# Patient Record
Sex: MALE | Race: WHITE
[De-identification: names, ages, dates, MRNs, and addresses within clinical notes are randomized per-mention and may not be internally consistent; named-entity substitution may affect disease eponyms.]

---

## 2019-11-17 ENCOUNTER — HOSPITAL ENCOUNTER (EMERGENCY)
Dept: HOSPITAL 75 - ER FS | Age: 69
Discharge: HOME | End: 2019-11-17
Payer: MEDICARE

## 2019-11-17 VITALS — WEIGHT: 279.99 LBS | HEIGHT: 67.8 IN | BODY MASS INDEX: 42.93 KG/M2

## 2019-11-17 VITALS — DIASTOLIC BLOOD PRESSURE: 59 MMHG | SYSTOLIC BLOOD PRESSURE: 112 MMHG

## 2019-11-17 DIAGNOSIS — T85.631A: Primary | ICD-10-CM

## 2019-11-17 DIAGNOSIS — Z13.89: ICD-10-CM

## 2019-11-17 NOTE — ED INTEGUMENTARY GENERAL
General


Chief Complaint:  Skin/Wound Problems


Stated Complaint:  LEAK FROM PARACENTESIS





History of Present Illness


Date Seen by Provider:  Nov 17, 2019


Time Seen by Provider:  22:30


Initial Comments


The patient is a 69-year-old male with a history of nonalcoholic fatty liver 

disease with cirrhosis and ascites who has scheduled therapeutic paracenteses 

every week. He presents with concern for a small, indolent but persistent leak 

from his most recent paracentesis site. This is been leaking over the last 1-2 

days. He denies any other complaints or concerns at this time and states he 

otherwise is at his baseline. NO abdominal pain. Wife has been applying pressure

dressings without stopping the leak.





Allergies and Home Medications


Patient Home Medication List


Home Medication List Reviewed:  Yes





Review of Systems


Review of Systems


Constitutional:  see HPI





All Other Systems Reviewed


Negative Unless Noted:  Yes (Negative excepted noted.)





Past Medical-Social-Family Hx


Past Med/Social Hx:  Reviewed Nursing Past Med/Soc Hx


Patient Social History


Recent Foreign Travel:  No


Contact w/Someone Who Travel:  No





Family Medical History


Reviewed Nursing Family Hx





Physical Exam


Vital Signs


Capillary Refill :


General Appearance:  no apparent distress


Comments


This is an older  male who appearing nontoxic and in no acute distress.

Head is normocephalic and atraumatic. Neck is supple and nontender. Oropharynx 

is moist. Lungs are clear to auscultation in all stations. There is normal S1 

and S2 without rubs or gallops and capillary refill is appropriate, less than 2 

seconds globally. Abdomen is soft and protuberant and mildly distended with a 

positive fluid wave which patient states is normal for him. There is a 

paracentesis site in the right midabdomen which is minimally leaking clear 

ascitic fluid at a very low rate. No surrounding erythema, warmth, swelling or 

tenderness. Skin is warm and dry without cyanosis,, clubbing or edema. 

Psychiatrically, the patient demonstrates appropriate mood and affect and is 

alert.





Progress/Results/Core Measures


Progress


Progress Note :  


   Time:  22:58


Progress Note


Dermabond in multiple layers utilized to stop paracentesis site leak with good 

results. Pressure dressing replaced and patient is not having any further 

problems. He feels ready to go home. We'll proceed with discharge home at this 

time. Patient is counseled to follow up very closely with his GI physician in 

the next 1-2 days understands that if he feels worse instead of better or 

develops other new symptoms of concern that he should return right away for 

reevaluation. All questions are answered.





Departure


Impression





   Primary Impression:  


   Status post abdominal paracentesis


   Additional Impression:  


   Encounter for screening for other disorder


Disposition:  01 HOME, SELF-CARE


Condition:  Improved





Departure-Patient Inst.


Referrals:  


JOSÉ MIGUEL BRISENO MD (PCP/Family)


Primary Care Physician





Add. Discharge Instructions:  


Follow-up with your GI doctor in the next 1-2 days. Return to the emergency room

right away with worsen symptoms or other new concerns.











LASHAWN CORNELL MD              Nov 17, 2019 23:01


POS

## 2021-02-09 ENCOUNTER — HOSPITAL ENCOUNTER (EMERGENCY)
Dept: HOSPITAL 75 - ER FS | Age: 71
Discharge: ANOTHER HOSPITAL | End: 2021-02-09
Payer: MEDICARE

## 2021-02-09 VITALS — SYSTOLIC BLOOD PRESSURE: 119 MMHG | DIASTOLIC BLOOD PRESSURE: 49 MMHG

## 2021-02-09 VITALS — WEIGHT: 264.55 LBS | HEIGHT: 69.69 IN | BODY MASS INDEX: 38.3 KG/M2

## 2021-02-09 DIAGNOSIS — Z87.891: ICD-10-CM

## 2021-02-09 DIAGNOSIS — E66.9: ICD-10-CM

## 2021-02-09 DIAGNOSIS — N28.9: ICD-10-CM

## 2021-02-09 DIAGNOSIS — K72.90: Primary | ICD-10-CM

## 2021-02-09 DIAGNOSIS — K74.60: ICD-10-CM

## 2021-02-09 LAB
ALBUMIN SERPL-MCNC: 2.3 GM/DL (ref 3.2–4.5)
ALP SERPL-CCNC: 135 U/L (ref 40–136)
ALT SERPL-CCNC: 24 U/L (ref 0–55)
AMMONIA PLAS-SCNC: 118 UMOL/L (ref 11–32)
APTT PPP: YELLOW S
ARTERIAL PATENCY WRIST A: NEGATIVE
BACTERIA #/AREA URNS HPF: NEGATIVE /HPF
BASE EXCESS STD BLDA CALC-SCNC: 4.3 MMOL/L (ref -2.5–2.5)
BASOPHILS # BLD AUTO: 0 10^3/UL (ref 0–0.1)
BASOPHILS NFR BLD AUTO: 1 % (ref 0–10)
BDY SITE: (no result)
BILIRUB SERPL-MCNC: 3.4 MG/DL (ref 0.1–1)
BILIRUB UR QL STRIP: NEGATIVE
BODY TEMPERATURE: 35.4
BUN/CREAT SERPL: 22
CALCIUM SERPL-MCNC: 8.5 MG/DL (ref 8.5–10.1)
CHLORIDE SERPL-SCNC: 97 MMOL/L (ref 98–107)
CO2 BLDA CALC-SCNC: 27.7 MMOL/L (ref 21–31)
CO2 SERPL-SCNC: 25 MMOL/L (ref 21–32)
CREAT SERPL-MCNC: 1.83 MG/DL (ref 0.6–1.3)
EOSINOPHIL # BLD AUTO: 0.3 10^3/UL (ref 0–0.3)
EOSINOPHIL NFR BLD AUTO: 4 % (ref 0–10)
FIBRINOGEN PPP-MCNC: CLEAR MG/DL
GFR SERPLBLD BASED ON 1.73 SQ M-ARVRAT: 37 ML/MIN
GLUCOSE SERPL-MCNC: 174 MG/DL (ref 70–105)
GLUCOSE UR STRIP-MCNC: NEGATIVE MG/DL
HCT VFR BLD CALC: 33 % (ref 40–54)
HGB BLD-MCNC: 11.6 G/DL (ref 13.3–17.7)
HYALINE CASTS #/AREA URNS LPF: (no result) /LPF
INHALED O2 FLOW RATE: (no result) L/MIN
INR PPP: 1.5 (ref 0.8–1.4)
KETONES UR QL STRIP: NEGATIVE
LEUKOCYTE ESTERASE UR QL STRIP: NEGATIVE
LYMPHOCYTES # BLD AUTO: 0.8 X 10^3 (ref 1–4)
LYMPHOCYTES NFR BLD AUTO: 13 % (ref 12–44)
MAGNESIUM SERPL-MCNC: 2.2 MG/DL (ref 1.6–2.4)
MANUAL DIFFERENTIAL PERFORMED BLD QL: NO
MCH RBC QN AUTO: 38 PG (ref 25–34)
MCHC RBC AUTO-ENTMCNC: 35 G/DL (ref 32–36)
MCV RBC AUTO: 107 FL (ref 80–99)
MONOCYTES # BLD AUTO: 0.8 X 10^3 (ref 0–1)
MONOCYTES NFR BLD AUTO: 13 % (ref 0–12)
NEUTROPHILS # BLD AUTO: 4.3 X 10^3 (ref 1.8–7.8)
NEUTROPHILS NFR BLD AUTO: 69 % (ref 42–75)
NITRITE UR QL STRIP: NEGATIVE
PCO2 BLDA: 32 MMHG (ref 35–45)
PH BLDA: 7.53 [PH] (ref 7.37–7.43)
PH UR STRIP: 6.5 [PH] (ref 5–9)
PLATELET # BLD: 110 10^3/UL (ref 130–400)
PMV BLD AUTO: 9.7 FL (ref 7.4–10.4)
PO2 BLDA: 70 MMHG (ref 79–93)
POTASSIUM SERPL-SCNC: 4.6 MMOL/L (ref 3.6–5)
PROT SERPL-MCNC: 4.9 GM/DL (ref 6.4–8.2)
PROT UR QL STRIP: NEGATIVE
PROTHROMBIN TIME: 18.8 SEC (ref 12.2–14.7)
RBC #/AREA URNS HPF: (no result) /HPF
SAO2 % BLDA FROM PO2: 96 % (ref 94–100)
SODIUM SERPL-SCNC: 130 MMOL/L (ref 135–145)
SP GR UR STRIP: 1.01 (ref 1.02–1.02)
SQUAMOUS #/AREA URNS HPF: (no result) /HPF
TSH SERPL DL<=0.05 MIU/L-ACNC: 1.43 UIU/ML (ref 0.35–4.94)
VENTILATION MODE VENT: NO
WBC # BLD AUTO: 6.2 10^3/UL (ref 4.3–11)
WBC #/AREA URNS HPF: (no result) /HPF

## 2021-02-09 PROCEDURE — 82805 BLOOD GASES W/O2 SATURATION: CPT

## 2021-02-09 PROCEDURE — 87040 BLOOD CULTURE FOR BACTERIA: CPT

## 2021-02-09 PROCEDURE — 85610 PROTHROMBIN TIME: CPT

## 2021-02-09 PROCEDURE — 72125 CT NECK SPINE W/O DYE: CPT

## 2021-02-09 PROCEDURE — 70450 CT HEAD/BRAIN W/O DYE: CPT

## 2021-02-09 PROCEDURE — 85025 COMPLETE CBC W/AUTO DIFF WBC: CPT

## 2021-02-09 PROCEDURE — 51702 INSERT TEMP BLADDER CATH: CPT

## 2021-02-09 PROCEDURE — 83735 ASSAY OF MAGNESIUM: CPT

## 2021-02-09 PROCEDURE — 36415 COLL VENOUS BLD VENIPUNCTURE: CPT

## 2021-02-09 PROCEDURE — 81000 URINALYSIS NONAUTO W/SCOPE: CPT

## 2021-02-09 PROCEDURE — 83605 ASSAY OF LACTIC ACID: CPT

## 2021-02-09 PROCEDURE — 71045 X-RAY EXAM CHEST 1 VIEW: CPT

## 2021-02-09 PROCEDURE — 82140 ASSAY OF AMMONIA: CPT

## 2021-02-09 PROCEDURE — 84443 ASSAY THYROID STIM HORMONE: CPT

## 2021-02-09 PROCEDURE — 80053 COMPREHEN METABOLIC PANEL: CPT

## 2021-02-09 NOTE — DIAGNOSTIC IMAGING REPORT
INDICATION: Sepsis



Frontal chest obtained at 149 p.m. 



There is  no prior study for comparison.



Heart is normal in size. There is central vascular congestion.

There is some mild right basilar infiltrate versus atelectasis.

There is no pneumothorax.



IMPRESSION:



Poor inspiration. There is central vascular congestion with some

right basilar atelectatic change versus infiltrate.



Dictated by: 



  Dictated on workstation # [id number]

## 2021-02-09 NOTE — ED GENERAL
General


Chief Complaint:  Altered Mental Status


Stated Complaint:  AMS | HX LIVER FAILURE


Nursing Triage Note:  


PT HAS BEEN MORE ALTERED THAN USUAL.  HE HAS LIVER FAILURE AND HAS ELAVATED 


AMMONIA LEVELS AT TIMES.


Nursing Sepsis Screen:  No Definite Risk


Source of Information:  EMS, Family, Old Records


Exam Limitations:  No Limitations





History of Present Illness


Date Seen by Provider:  2021


Time Seen by Provider:  12:00


Initial Comments


This 70-year-old gentleman is brought to the emergency room via EMS from home 

due to altered mental status.  He is accompanied by his wife.  She reports he 

started exhibiting symptoms of confusion on .  On the evening of 

uary 6 or 7 he had a fall and bumped into the door frame.  He has some skin 

tears on his forearms as a result.  He has had decreased oral intake of food and

fluid.  He has also had decreased bowel movements which is unusual because he 

takes lactulose.  He has history of cirrhosis and saw his gastroenterologist in 

[place] last Wednesday.  He had a good report with her and diuretics were 

decreased at that time.  This morning he has been minimally responsive and 

incontinent of urine.  Blood pressure is lower than usual.  He has not had any 

of his morning medications due to decreased alertness.





Allergies and Home Medications


Patient Home Medication List


Home Medication List Reviewed:  Yes





Review of Systems


Review of Systems


Constitutional:  see HPI; No fever


EENTM:  no symptoms reported


Respiratory:  no symptoms reported


Cardiovascular:  see HPI


Gastrointestinal:  see HPI


Genitourinary:  see HPI


Musculoskeletal:  no symptoms reported


Skin:  see HPI, change in color (Jaundice)


Psychiatric/Neurological:  See HPI


Hematologic/Lymphatic:  No Symptoms Reported


Immunological/Allergic:  no symptoms reported





Past Medical-Social-Family Hx


Past Med/Social Hx:  Reviewed Nursing Past Med/Soc Hx


Patient Social History


Alcohol Use:  Denies Use


Smoking Status:  Former Smoker


2nd Hand Smoke Exposure:  No


Recent Infectious Disease Expo:  No


Recent Hopitalizations:  No





Seasonal Allergies


Seasonal Allergies:  No





Past Medical History


Surgeries:  Yes


Abdominal (TIPS)


Respiratory:  No


Cardiac:  Yes


Hypertension


Neurological:  No


Genitourinary:  No


Gastrointestinal:  Yes


Liver Disease/Jaundice


Musculoskeletal:  No


Endocrine:  No


HEENT:  No


Cancer:  No


Psychosocial:  No


Integumentary:  No


Blood Disorders:  No





Physical Exam


Vital Signs





Vital Signs - First Documented








 21





 12:00


 


Temp 35.4


 


Pulse 62


 


Resp 15


 


B/P (MAP) 100/82 (88)


 


Pulse Ox 99


 


O2 Delivery Room Air





Capillary Refill : Less Than 3 Seconds


Height, Weight, BMI


Height: '"


Weight: lbs. oz. kg; 38.00 BMI


Method:


General Appearance:  No Apparent Distress, WD/WN, Obese, Other (Minimally res

ponsive)


HEENT:  Normal ENT Inspection, Other (No obvious trauma)


Neck:  Normal Inspection


Respiratory:  Lungs Clear, No Accessory Muscle Use, No Respiratory Distress, 

Decreased Breath Sounds


Cardiovascular:  Regular Rate, Rhythm, No Edema, No Murmur, Normal Peripheral 

Pulses


Extremity:  No Pedal Edema, Other (Multiple skin tears and bruises on the upper 

extremities)


Neurologic/Psychiatric:  Other (Responsive to loud voice and painful stimulus.  

Moans and groans with stimulation.  Does not open eyes and does not have any 

comprehensible speech.)


Skin:  Warm/Dry, Jaundice





Focused Exam


Lactate Level


21 14:00: Lactic Acid Level 2.56*H


21 16:03: Lactic Acid Level 1.71





Lactic Acid Level








Progress/Results/Core Measures


Suspected Sepsis


Recent Fever Within 48 Hours:  Yes


Infection Criteria Present:  None


New/Unexplained  Altered Menta:  Yes


Sepsis Screen:  No Definite Risk


SIRS


Temperature: 


Pulse: 62 


Respiratory Rate: 15


 


Laboratory Tests


21 12:11: White Blood Count 6.2


Blood Pressure 100 /82 


Mean: 88


 


21 14:00: Lactic Acid Level 2.56*H


21 16:03: Lactic Acid Level 1.71


Laboratory Tests


21 12:11: Platelet Count 110L


21 12:12: 


Creatinine 1.83H, INR Comment 1.5H, Total Bilirubin 3.4H








Results/Orders


Lab Results





Laboratory Tests








Test


 21


12:11 21


12:12 21


12:34 21


14:00 Range/Units


 


 


White Blood Count


 6.2 


 


 


 


 4.3-11.0


10^3/uL


 


Red Blood Count


 3.08 L


 


 


 


 4.35-5.85


10^6/uL


 


Hemoglobin 11.6 L    13.3-17.7  G/DL


 


Hematocrit 33 L    40-54  %


 


Mean Corpuscular Volume 107 H    80-99  FL


 


Mean Corpuscular Hemoglobin 38 H    25-34  PG


 


Mean Corpuscular Hemoglobin


Concent 35 


 


 


 


 32-36  G/DL





 


Red Cell Distribution Width 14.7 H    10.0-14.5  %


 


Platelet Count


 110 L


 


 


 


 130-400


10^3/uL


 


Mean Platelet Volume 9.7     7.4-10.4  FL


 


Immature Granulocyte % (Auto) 1      %


 


Neutrophils (%) (Auto) 69     42-75  %


 


Lymphocytes (%) (Auto) 13     12-44  %


 


Monocytes (%) (Auto) 13 H    0-12  %


 


Eosinophils (%) (Auto) 4     0-10  %


 


Basophils (%) (Auto) 1     0-10  %


 


Neutrophils # (Auto) 4.3     1.8-7.8  X 10^3


 


Lymphocytes # (Auto) 0.8 L    1.0-4.0  X 10^3


 


Monocytes # (Auto) 0.8     0.0-1.0  X 10^3


 


Eosinophils # (Auto)


 0.3 


 


 


 


 0.0-0.3


10^3/uL


 


Basophils # (Auto)


 0.0 


 


 


 


 0.0-0.1


10^3/uL


 


Immature Granulocyte # (Auto)


 0.1 


 


 


 


 0.0-0.1


10^3/uL


 


Prothrombin Time  18.8 H   12.2-14.7  SEC


 


INR Comment  1.5 H   0.8-1.4  


 


Blood Gas Puncture Site  LT RADIAL     


 


Blood Gas Patient Temperature  35.4     


 


Arterial Blood pH  7.53 H   7.37-7.43  


 


Arterial Blood Partial


Pressure CO2 


 32 L


 


 


 35-45  MMHG





 


Arterial Blood Partial


Pressure O2 


 70 L


 


 


 79-93  MMHG





 


Arterial Blood HCO3  27    23-27  MMOL/L


 


Arterial Blood Total CO2


 


 27.7 


 


 


 21.0-31.0


MMOL/L


 


Arterial Blood Oxygen


Saturation 


 96 


 


 


   %





 


Arterial Blood Base Excess


 


 4.3 H


 


 


 -2.5-2.5


MMOL/L


 


Sung Test  NEGATIVE     


 


Blood Gas Ventilator Setting  NO     


 


Blood Gas Inspired Oxygen  ROOM AIR     


 


Sodium Level  130 L   135-145  MMOL/L


 


Potassium Level  4.6    3.6-5.0  MMOL/L


 


Chloride Level  97 L     MMOL/L


 


Carbon Dioxide Level  25    21-32  MMOL/L


 


Anion Gap  8    5-14  MMOL/L


 


Blood Urea Nitrogen  40 H   7-18  MG/DL


 


Creatinine


 


 1.83 H


 


 


 0.60-1.30


MG/DL


 


Estimat Glomerular Filtration


Rate 


 37 


 


 


  





 


BUN/Creatinine Ratio  22     


 


Glucose Level  174 H     MG/DL


 


Calcium Level  8.5    8.5-10.1  MG/DL


 


Corrected Calcium  9.9    8.5-10.1  MG/DL


 


Magnesium Level  2.2    1.6-2.4  MG/DL


 


Total Bilirubin  3.4 H   0.1-1.0  MG/DL


 


Aspartate Amino Transf


(AST/SGOT) 


 41 H


 


 


 5-34  U/L





 


Alanine Aminotransferase


(ALT/SGPT) 


 24 


 


 


 0-55  U/L





 


Alkaline Phosphatase  135      U/L


 


Ammonia  118 H   11-32  UMOL/L


 


Total Protein  4.9 L   6.4-8.2  GM/DL


 


Albumin  2.3 L   3.2-4.5  GM/DL


 


TSH Cascade Testing


 


 1.43 


 


 


 0.35-4.94


UIU/ML


 


Urine Color   YELLOW    


 


Urine Clarity   CLEAR    


 


Urine pH   6.5   5-9  


 


Urine Specific Gravity   1.015 L  1.016-1.022  


 


Urine Protein   NEGATIVE   NEGATIVE  


 


Urine Glucose (UA)   NEGATIVE   NEGATIVE  


 


Urine Ketones   NEGATIVE   NEGATIVE  


 


Urine Nitrite   NEGATIVE   NEGATIVE  


 


Urine Bilirubin   NEGATIVE   NEGATIVE  


 


Urine Urobilinogen   0.2   < = 1.0  MG/DL


 


Urine Leukocyte Esterase   NEGATIVE   NEGATIVE  


 


Urine RBC (Auto)   NEGATIVE   NEGATIVE  


 


Urine RBC   RARE    /HPF


 


Urine WBC   0-2    /HPF


 


Urine Squamous Epithelial


Cells 


 


 RARE 


 


  /HPF





 


Urine Crystals   NONE    /LPF


 


Urine Bacteria   NEGATIVE    /HPF


 


Urine Casts   PRESENT    /LPF


 


Urine Hyaline Casts   0-2 H   /LPF


 


Urine Mucus   NEGATIVE    /LPF


 


Urine Culture Indicated   NO    


 


Lactic Acid Level


 


 


 


 2.56 *H


 0.50-2.00


MMOL/L


 


Test


 21


16:03 


 


 


 Range/Units


 


 


Lactic Acid Level


 1.71 


 


 


 


 0.50-2.00


MMOL/L








My Orders





Orders - GABINO MEDINA MD


Ammonia (21 12:01)


Cbc With Automated Diff (21 12:01)


Comprehensive Metabolic Panel (21 12:01)


Ua Culture If Indicated (21 12:01)


Ed Iv/Invasive Line Start (21 12:01)


Magnesium (21 12:19)


Protime With Inr (21 12:19)


Thyroid Analyzer (21 12:19)


Arterial Blood Gas (21 12:19)


Ekg Tracing (21 12:38)


Ct Head/Cervical Spine Wo (21 12:57)


Blood Culture (21 13:47)


Sputum Culture (21 13:47)


Chest 1 View Ap/Pa Only (21 13:47)


Vital Signs Adult Sepsis Patie Q15M (21 13:47)


Remove Rings In Anticipation O (21 13:47)


Lactic Acid Analyzer (21 13:47)





Vital Signs/I&O











 21





 17:42


 


Temp 36.9


 


Pulse 62


 


Resp 18


 


B/P (MAP) 119/49


 


Pulse Ox 99


 


O2 Delivery Room Air





Capillary Refill : Less Than 3 Seconds








Blood Pressure Mean:                    88








Progress Note #1:  


   Time:  15:03


Progress Note


Work-up thus far has revealed elevated creatinine.  The chronicity of his renal 

failure is uncertain as we do not have prior labs.  The ammonia level is a send 

out lab and is still pending.  Patient continues to have significantly altered 

mental status.  CT of the head and cervical spine was negative for acute injury.

 Patient will need to be admitted as he cannot return home in this condition.  

He receives his specialty care at [facility] and his wife has 

requested transfer there for continuity of care.  I agree transfer to [facility] is the most appropriate course of action and necessary for 

continuity of care.  I have placed a call to the transfer line and am awaiting a

call back.  In the meantime patient has received a liter of IV normal saline.


Progress Note #2:  


Progress Note


Ammonia was significantly elevated.  Transfer was accepted by Dr. Elizabeth.  No 

additional therapies where requested prior to transfer.





ECG


Initial ECG Impression Date:  2021


Initial ECG Impression Time:  12:27


Initial ECG Rate:  60


Initial ECG Rhythm:  Normal Sinus


Initial ECG Intervals:  Normal


Initial ECG Impression:  Normal


Comment


Normal sinus rhythm with no ST elevation or depression.  No abnormal intervals 

or axis deviation.





Diagnostic Imaging





   Diagonstic Imaging:  CT


   Plain Films/CT/US/NM/MRI:  c-spine, head


Comments


NAME:   JARETH WORRELL


MED REC#:   [id number]


ACCOUNT#:   [id number]


PT STATUS:   REG ER


:   1950


PHYSICIAN:   GABINO MEDINA MD


ADMIT DATE:   21/ER FS


***Draft***


Date of Exam:21





CT HEAD/CERVICAL SPINE WO





PROCEDURE: 


CT head and CT cervical spine without contrast.





TECHNIQUE: 


Multiple contiguous axial images were obtained through the brain


and cervical spine without the use of intravenous contrast.


Sagittal and coronal reformations through the cervical spine were


then performed. Auto Exposure Controls were utilized during the


CT exam to meet ALARA standards for radiation dose reduction. 





INDICATION:   


Fall, altered mental status.





COMPARISON:  


I have no comparison.





FINDINGS:





HEAD: 


There is no intracerebral hemorrhage. There is no hydrocephalus.


There is no focal or generalized cerebral edema and there are no


findings of an elevation of the intracranial pressures. There is


mild generalized cerebral cortical atrophy without hydrocephalus.


The basilar cisterns are patent. There is no sulcal effacement.


The orbits, sinuses, and calvarium appear nonacute.





CERVICAL SPINE: 


The central skull base is intact and the craniocervical


relationship is maintained.  The cervical body heights are within


normal limits and the alignment is anatomic. There are


degenerative changes to the discs, endplates, and facets


throughout the cervical spine without high-grade canal stenosis.


No cervical fracture, paravertebral hemorrhage, or traumatic


malalignment is found. The visualized thoracic inlet and


pulmonary apices reveal partial visualization of right apical


pleural fluid. A dedicated upright chest x-ray would be


recommended.





IMPRESSION:





CT HEAD: 


There is some mild atrophy but no hemorrhage or acute finding.





CT CERVICAL SPINE: 


Degenerative but no fracture or traumatic malalignment. Likely


partially visualized right apical pleural fluid with a chest


x-ray recommended.





  Dictated on workstation # [id number]





Dict:   21 [id number]


Trans:   21 1339


 [id number]





Interpreted by:     KEVIN CHANG


   Reviewed:  Reviewed by Me








   Diagonstic Imaging:  Xray


   Plain Films/CT/US/NM/MRI:  chest


Comments


NAME:   JARETH WORRELL


MED REC#:   [id number]


ACCOUNT#:   [id number]


PT STATUS:   REG ER


:   1950


PHYSICIAN:   GABINO MEDINA MD


ADMIT DATE:   21/ER FS


***Draft***


Date of Exam:21





CHEST 1 VIEW AP/PA ONLY





INDICATION: Sepsis





Frontal chest obtained at 149 p.m. 





There is  no prior study for comparison.





Heart is normal in size. There is central vascular congestion.


There is some mild right basilar infiltrate versus atelectasis.


There is no pneumothorax.





IMPRESSION:





Poor inspiration. There is central vascular congestion with some


right basilar atelectatic change versus infiltrate.





  Dictated on workstation # [id number]





Dict:   21 1441


Trans:   21 1447


[facility] [id number]





Interpreted by:     VIRIDIANA SHARMA MD


   Reviewed:  Reviewed by Me





Departure


Impression





   Primary Impression:  


   Hepatic encephalopathy


   Additional Impressions:  


   Cirrhosis


   Qualified Codes:  K74.60 - Unspecified cirrhosis of liver


   Renal insufficiency


Disposition:   XFER SHT-TRM HOSP


Condition:  Stable





Transfer


Transfer Reason:  Exceeds level of care


Time Spoke to Accepting Phy:  16:35


Transfer Progress Notes


Transfer accepted by Dr. Elizabeth at [facility].


Transfer Facility:  


[facility]


Method of Transfer:  EMS





Departure-Patient Inst.


Referrals:  


JOSÉ MIGUEL BRISENO MD (PCP/Family)


Primary Care Physician











GABINO MEDINA MD         2021 13:07

## 2021-02-09 NOTE — DIAGNOSTIC IMAGING REPORT
PROCEDURE: 

CT head and CT cervical spine without contrast.



TECHNIQUE: 

Multiple contiguous axial images were obtained through the brain

and cervical spine without the use of intravenous contrast.

Sagittal and coronal reformations through the cervical spine were

then performed. Auto Exposure Controls were utilized during the

CT exam to meet ALARA standards for radiation dose reduction. 



INDICATION:   

Fall, altered mental status.



COMPARISON:  

I have no comparison.



FINDINGS:



HEAD: 

There is no intracerebral hemorrhage. There is no hydrocephalus.

There is no focal or generalized cerebral edema and there are no

findings of an elevation of the intracranial pressures. There is

mild generalized cerebral cortical atrophy without hydrocephalus.

The basilar cisterns are patent. There is no sulcal effacement.

The orbits, sinuses, and calvarium appear nonacute.



CERVICAL SPINE: 

The central skull base is intact and the craniocervical

relationship is maintained.  The cervical body heights are within

normal limits and the alignment is anatomic. There are

degenerative changes to the discs, endplates, and facets

throughout the cervical spine without high-grade canal stenosis.

No cervical fracture, paravertebral hemorrhage, or traumatic

malalignment is found. The visualized thoracic inlet and

pulmonary apices reveal partial visualization of right apical

pleural fluid. A dedicated upright chest x-ray would be

recommended.



IMPRESSION:



CT HEAD: 

There is some mild atrophy but no hemorrhage or acute finding.



CT CERVICAL SPINE: 

Degenerative but no fracture or traumatic malalignment. Likely

partially visualized right apical pleural fluid with a chest

x-ray recommended.



 



Dictated by: 



  Dictated on workstation # [id number]